# Patient Record
Sex: MALE | Race: WHITE | NOT HISPANIC OR LATINO | URBAN - METROPOLITAN AREA
[De-identification: names, ages, dates, MRNs, and addresses within clinical notes are randomized per-mention and may not be internally consistent; named-entity substitution may affect disease eponyms.]

---

## 2021-04-20 NOTE — PATIENT DISCUSSION
[facility] [place] did MRI and CT no anuerysm or mass found, pt to follow up in [place] with neurology.

## 2021-11-04 ENCOUNTER — PREPPED CHART (OUTPATIENT)
Dept: URBAN - METROPOLITAN AREA CLINIC 49 | Facility: CLINIC | Age: 31
End: 2021-11-04

## 2021-12-23 ENCOUNTER — NEW PATIENT (OUTPATIENT)
Dept: URBAN - METROPOLITAN AREA CLINIC 49 | Facility: CLINIC | Age: 31
End: 2021-12-23

## 2021-12-23 DIAGNOSIS — H40.013: ICD-10-CM

## 2021-12-23 DIAGNOSIS — H26.491: ICD-10-CM

## 2021-12-23 DIAGNOSIS — H35.352: ICD-10-CM

## 2021-12-23 PROCEDURE — 92015 DETERMINE REFRACTIVE STATE: CPT

## 2021-12-23 PROCEDURE — 76514 ECHO EXAM OF EYE THICKNESS: CPT

## 2021-12-23 PROCEDURE — 92004 COMPRE OPH EXAM NEW PT 1/>: CPT

## 2021-12-23 ASSESSMENT — VISUAL ACUITY
OU_SC: J3-2
OD_SC: 20/60+2
OD_GLARE: <400
OD_GLARE: 20/80
OS_SC: 20/200

## 2021-12-23 ASSESSMENT — TONOMETRY
OS_IOP_MMHG: 22
OD_IOP_MMHG: 16
OS_IOP_MMHG: 18
OD_IOP_MMHG: 20

## 2021-12-23 ASSESSMENT — PACHYMETRY
OD_CT_UM: 599
OS_CT_UM: 595